# Patient Record
(demographics unavailable — no encounter records)

---

## 2025-04-11 NOTE — ASSESSMENT
[FreeTextEntry1] : Recommend CT of chest to complete staging imaging.  I had extensive discussion with the patient (45 minutes) regarding the diagnosis and treatment options. I recommended that he consider proceeding with a robotic right hemicolectomy possible open procedure.  The associated risks, benefits, alternatives of the procedure have been outlined discussed and reviewed with the patient's family. These risks including but not limited to bleeding, infection, anastomotic leak, need for secondary surgery, need for ileostomy, change in bowel habits, DVT/PE, as well as the risk of heart and lung complications infection and death were detailed. The patient understands these risks and consents the planned procedure. Appropriate  literature regarding surgery and post operative treatment/complications and enhanced recovery pathway has been detailed and reviewed. Consent was obtained. All questions were answered.

## 2025-04-11 NOTE — HISTORY OF PRESENT ILLNESS
[FreeTextEntry1] : 72 y/o M presents for initial evaluation of colon cancer.   Reason for visit: Colon cancer Referred by: Dr. Gordon Cohen  PMHx: arthritis, HTN, prostate cancer (tx with surgery) PSHx: appendectomy 1972, perineal prostatectomy 12/2020  Medications: iron supplement Allergies: NKDA  Denies family hx of CRC/IBD Smoker: denies   3/27/25 Endoscopy with Dr. Gordon Cohen  Impressions: -Esophageal plaques were found, suspicious for candidais. Biopsied.  -Non erosive gastritis, characterized by erythema and granularity. Biopsied.  -Normal duodenal bulb and second portion of the duodenum. Biopsied -Medium sized hiatal hernia.   3/27/25 Colonoscopy with Dr. Gordon Cohen, BP 9  Impressions: -one 8 mm polyp in the transverse colon, removed with a cold snare. Resected and retrieved.  -likely malignant partially obstructing tumor in the ascending colon. Biopsied. Tattooed. -internal non bleeding hemorrhoids  -diverticulosis int he sgimoid colon and in the descending colon.   Pathology: A. Duodenal mucosa within normal limits. No evidence of celiac sprue.  B. Gastric antral mucosa with mild reactive changes. Immunostain for H Pylori is negative.  C. Esophageal squamous mucosa within normal limits. No intraepithealial eosinophils identified.  D. Hyperplastic polyp.  E. 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 MMR and Her2 erica are pending. An addendum will follow.   4/3/25 CT Abdomen and Pelvis  Impression: 4.8 cm heterogenous soft tissue lesion in the ascending colon corresponding to known malignancy.  No evidence of abdominopelvic metastases.  Stable ectatic infrarenal aorta with calcifications.   Patient presents today for initial evaluation of colon cancer.  Patient is currently being followed by GI Dr. Cohen.  Patient is retired and has been alternating his time in  and in [facility]. Was feeling fatigue and tired for two months [facility] where is Hgb was reportedly 5.6. States he did not receive any transfusion there.  [place] on [place]25 and was hospitalized the same night at [facility]. Hgb was 6 and received 2 units of RBC.  Discharged with a Hgb of 8.3 and with advised GI follow up for colonoscopy.  Colonoscopy revealed a partially obstructing tumor in the ascending tumor. Pathology revealed mucinous adenocarcinoma of colon, moderately to poorly differentiated including signet-ring cells. MMR still pending.  Reports last colonoscopy was about more than ten years ago with normal finding.  Last Hgb drawn on 3/24 was 10.1  .  Currently overall patient feels well.  No longer feeling dizzy, fatigue. Denies abdominal pain.  Appetite has been well. Denies N/V. Believes he lost 3-4 pounds in the past three weeks.  Bowel movement is daily. Stool is soft and formed. Denies straining.  Is not aware if he had any bleeding in his stool as he usually does not look at his BM. Reports stool is now dark after starting iron supplement.   Denies any recent blood work.  Only had a CT abdomen and pelvis performed on 4/3/25  Has oncologist appointment with Dr. Mane Zimmerman next Wednesday on 4/16/25

## 2025-05-12 NOTE — HISTORY OF PRESENT ILLNESS
[FreeTextEntry1] : 72 y/o M presents for follow up s/p Robotic Right Hemicolectomy 04/30/2025  PMHx: arthritis, HTN, prostate cancer (tx with surgery) PSHx: appendectomy 1972, perineal prostatectomy 12/2020 Medications: iron supplement Allergies: NKDA Denies family hx of CRC/IBD Smoker: denies  3/27/25 Endoscopy with Dr. Gordon Cohen Impressions: -Esophageal plaques were found, suspicious for candidais. Biopsied. -Non erosive gastritis, characterized by erythema and granularity. Biopsied. -Normal duodenal bulb and second portion of the duodenum. Biopsied -Medium sized hiatal hernia.  3/27/25 Colonoscopy with Dr. Gordon Cohen, BP 9 Impressions: -one 8 mm polyp in the transverse colon, removed with a cold snare. Resected and retrieved. -likely malignant partially obstructing tumor in the ascending colon. Biopsied. Tattooed. -internal non bleeding hemorrhoids -diverticulosis int he sgimoid colon and in the descending colon.  Pathology: A. Duodenal mucosa within normal limits. No evidence of celiac sprue. B. Gastric antral mucosa with mild reactive changes. Immunostain for H Pylori is negative. C. Esophageal squamous mucosa within normal limits. No intraepithealial eosinophils identified. D. Hyperplastic polyp. E. 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 MMR and Her2 erica are pending. An addendum will follow.  4/3/25 CT Abdomen and Pelvis Impression: 4.8 cm heterogenous soft tissue lesion in the ascending colon corresponding to known malignancy. No evidence of abdominopelvic metastases. Stable ectatic infrarenal aorta with calcifications.   Seen 4/11/2025 for initial evaluation of colon cancer. Patient is currently being followed by GI Dr. Cohen. Patient is retired and has been alternating his time in  and in [facility]. Was feeling fatigue and tired for two months [facility] where is Hgb was reportedly 5.6. States he did not receive any transfusion there. [place] the [place] on 3[place]15/25 and was hospitalized the same night at [facility]. Hgb was 6 and received 2 units of RBC. Discharged with a Hgb of 8.3 and with advised GI follow up for colonoscopy. Colonoscopy revealed a partially obstructing tumor in the ascending tumor. Pathology revealed mucinous adenocarcinoma of colon, moderately to poorly differentiated including signet-ring cells. MMR still pending.   CT Chest 4/25/2025: 1. 4 mm solid nodule within the superior segment to the right lower lobe (4:134).  In view of the provided history of an extrathoracic malignancy. Short interval surveillance as per protocol to confirm stability is suggested. 2. 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 Periodic interval surveillance may be of value. 3. Circumferential calcification at the origin of the basilar left subclavian artery and a degree of luminal stenosis cannot be excluded. Further characterization may be of value as clinically indicated to include a nonemergent CTA of the aorta.  Outside Colonoscopy Pathology: A. Duodenum, Second Portion: -   Duodenal mucosa with intact villous architecture -   Negative for increased intraepithelial lymphocytes  B. Antrum: -   Antral mucosa with mild reactive change -   Negative for intestinal metaplasia or dysplasia -   H. pylori microorganisms not identified on H/E slides  C. Esophagus, Proximal: -   Unremarkable superficial squamous epithelium  D. Ascending Colon, Mass: -   Hyperplastic polyp  E. Transverse Colon, Polyp: -   Moderate to poorly differentiated mucinous adenocarcinoma, see note -   Occasional signet rel sings noted  Note: Per the outside pathology report, IHC for mismatch repair proteins reveals loss of nuclear expression for MSH6.  Staining for MSH 1, MSH2, and PMS2 are intact.  HER2/erica is reported as negative (score 0).   4/30/2025 Robotic Right Hemicolectomy Pathology: Specimen(s) Submitted 1  Apical node 2  Right colon  Final Diagnosis 1.  Apical lymph node, excision: -   1 lymph node negative for metastatic adenocarcinoma, 0/1   2.  Right colon, resection: -   Poorly differentiated mucinous adenocarcinoma -   5.7 cm greatest dimension -   Adenocarcinoma involves pericolic adipose tissue -   Margins negative for adenocarcinoma and adenoma -   Lymphovascular invasion not identified -   Perineural invasion not identified -   20 lymph nodes negative for metastatic adenocarcinoma, 0/20 -   2 tubular adenomas -   Diverticulosis -   See synoptic report below -   See note  Note: Mismatch repair protein IHC was performed on the prior consultation biopsy, case [id number].  Per the outside report, the malignant cells show loss of nuclear expression for MSH6.  The remaining proteins MSH1, MSH2, and PMS2 are reported as intact.  Lost off MSH6 only suggests a high probability of Boyce syndrome (sequencing and /or large deletion / duplication testing of germline MSH6 may be indicated).  Synoptic Summary 2: Colon and Rectum - Resection Specimen Procedure:  Right hemicolectomy Tumor Tumor Site:  Cecum Histologic Type:   Mucinous adenocarcinoma Histologic Grade:   G3, poorly differentiated Tumor Size:  5.7 Centimeters (cm) Multiple Primary Sites:   Not applicable Tumor Extent:   Invades through muscularis propria into the pericolonic or perirectal tissue Macroscopic Tumor Perforation:   Not identified Lymphatic and / or Vascular Invasion:    Not identified Perineural Invasion:   Not identified Tumor Budding Score:   Low (0-4) Treatment Effect:   No known presurgical therapy Margins Margin Status for Invasive Carcinoma:    All margins negative for invasive carcinoma  Margin Status for Non-Invasive Tumor:    All margins negative for high-grade dysplasia / intramucosal carcinoma and low-grade dysplasia Regional Lymph Nodes Regional Lymph Node Status:   All regional lymph nodes negative for tumor Number of Lymph Nodes Examined:   21 Tumor Deposits:   Not identified pTNM Classification (AJCC 8th Edition) pT Category:   pT3 pN Category:   pN0 Additional Findings Additional Findings:   Adenoma(s);  Diverticulosis Best Tumor Blocks for Future Studies  Patient presents for post op follow up. Overall doing well. Appetite good, energy good Moving bowels with out issue, denies any rectal bleeding Denies any abdominal pain RLQ inicision site slight pink here and there depending on clothes.

## 2025-05-12 NOTE — ASSESSMENT
[FreeTextEntry1] : Stage II right colon cancer.  MSH6 absent.  Rule out HNPCC syndrome.  Recommend genetics consultation.  Liberalize diet activity.  No indication for postoperative chemotherapy but will require surveillance.  Recommend return to primary GI in 1 year for colonoscopy.  However, patient may require yearly colonoscopy if HNPCC positive.

## 2025-05-12 NOTE — PHYSICAL EXAM
[de-identified] : Abdomen is soft, nontender, nondistended. Incisions are well healed. No hernia or masses

## 2025-05-12 NOTE — HISTORY OF PRESENT ILLNESS
[FreeTextEntry1] : 72 y/o M presents for follow up s/p Robotic Right Hemicolectomy 04/30/2025  PMHx: arthritis, HTN, prostate cancer (tx with surgery) PSHx: appendectomy 1972, perineal prostatectomy 12/2020 Medications: iron supplement Allergies: NKDA Denies family hx of CRC/IBD Smoker: denies  3/27/25 Endoscopy with Dr. Gordon Cohen Impressions: -Esophageal plaques were found, suspicious for candidais. Biopsied. -Non erosive gastritis, characterized by erythema and granularity. Biopsied. -Normal duodenal bulb and second portion of the duodenum. Biopsied -Medium sized hiatal hernia.  3/27/25 Colonoscopy with Dr. Gordon Cohen, BP 9 Impressions: -one 8 mm polyp in the transverse colon, removed with a cold snare. Resected and retrieved. -likely malignant partially obstructing tumor in the ascending colon. Biopsied. Tattooed. -internal non bleeding hemorrhoids -diverticulosis int he sgimoid colon and in the descending colon.  Pathology: A. Duodenal mucosa within normal limits. No evidence of celiac sprue. B. Gastric antral mucosa with mild reactive changes. Immunostain for H Pylori is negative. C. Esophageal squamous mucosa within normal limits. No intraepithealial eosinophils identified. D. Hyperplastic polyp. E. 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 MMR and Her2 erica are pending. An addendum will follow.  4/3/25 CT Abdomen and Pelvis Impression: 4.8 cm heterogenous soft tissue lesion in the ascending colon corresponding to known malignancy. No evidence of abdominopelvic metastases. Stable ectatic infrarenal aorta with calcifications.   Seen 4/11/2025 for initial evaluation of colon cancer. Patient is currently being followed by GI Dr. Cohen. Patient is retired and has been alternating his time in  and in [facility]. Was feeling fatigue and tired for two months [facility] where is Hgb was reportedly 5.6. States he did not receive any transfusion there. [place] the [place] on 3[place]25 and was hospitalized the same night at [facility]. Hgb was 6 and received 2 units of RBC. Discharged with a Hgb of 8.3 and with advised GI follow up for colonoscopy. Colonoscopy revealed a partially obstructing tumor in the ascending tumor. Pathology revealed mucinous adenocarcinoma of colon, moderately to poorly differentiated including signet-ring cells. MMR still pending.   CT Chest 4/25/2025: 1. 4 mm solid nodule within the superior segment to the right lower lobe (4:134).  In view of the provided history of an extrathoracic malignancy. Short interval surveillance as per protocol to confirm stability is suggested. 2. 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 Periodic interval surveillance may be of value. 3. Circumferential calcification at the origin of the basilar left subclavian artery and a degree of luminal stenosis cannot be excluded. Further characterization may be of value as clinically indicated to include a nonemergent CTA of the aorta.  Outside Colonoscopy Pathology: A. Duodenum, Second Portion: -   Duodenal mucosa with intact villous architecture -   Negative for increased intraepithelial lymphocytes  B. Antrum: -   Antral mucosa with mild reactive change -   Negative for intestinal metaplasia or dysplasia -   H. pylori microorganisms not identified on H/E slides  C. Esophagus, Proximal: -   Unremarkable superficial squamous epithelium  D. Ascending Colon, Mass: -   Hyperplastic polyp  E. Transverse Colon, Polyp: -   Moderate to poorly differentiated mucinous adenocarcinoma, see note -   Occasional signet rel sings noted  Note: Per the outside pathology report, IHC for mismatch repair proteins reveals loss of nuclear expression for MSH6.  Staining for MSH 1, MSH2, and PMS2 are intact.  HER2/erica is reported as negative (score 0).   4/30/2025 Robotic Right Hemicolectomy Pathology: Specimen(s) Submitted 1  Apical node 2  Right colon  Final Diagnosis 1.  Apical lymph node, excision: -   1 lymph node negative for metastatic adenocarcinoma, 0/1   2.  Right colon, resection: -   Poorly differentiated mucinous adenocarcinoma -   5.7 cm greatest dimension -   Adenocarcinoma involves pericolic adipose tissue -   Margins negative for adenocarcinoma and adenoma -   Lymphovascular invasion not identified -   Perineural invasion not identified -   20 lymph nodes negative for metastatic adenocarcinoma, 0/20 -   2 tubular adenomas -   Diverticulosis -   See synoptic report below -   See note  Note: Mismatch repair protein IHC was performed on the prior consultation biopsy, case [id number].  Per the outside report, the malignant cells show loss of nuclear expression for MSH6.  The remaining proteins MSH1, MSH2, and PMS2 are reported as intact.  Lost off MSH6 only suggests a high probability of Boyce syndrome (sequencing and /or large deletion / duplication testing of germline MSH6 may be indicated).  Synoptic Summary 2: Colon and Rectum - Resection Specimen Procedure:  Right hemicolectomy Tumor Tumor Site:  Cecum Histologic Type:   Mucinous adenocarcinoma Histologic Grade:   G3, poorly differentiated Tumor Size:  5.7 Centimeters (cm) Multiple Primary Sites:   Not applicable Tumor Extent:   Invades through muscularis propria into the pericolonic or perirectal tissue Macroscopic Tumor Perforation:   Not identified Lymphatic and / or Vascular Invasion:    Not identified Perineural Invasion:   Not identified Tumor Budding Score:   Low (0-4) Treatment Effect:   No known presurgical therapy Margins Margin Status for Invasive Carcinoma:    All margins negative for invasive carcinoma  Margin Status for Non-Invasive Tumor:    All margins negative for high-grade dysplasia / intramucosal carcinoma and low-grade dysplasia Regional Lymph Nodes Regional Lymph Node Status:   All regional lymph nodes negative for tumor Number of Lymph Nodes Examined:   21 Tumor Deposits:   Not identified pTNM Classification (AJCC 8th Edition) pT Category:   pT3 pN Category:   pN0 Additional Findings Additional Findings:   Adenoma(s);  Diverticulosis Best Tumor Blocks for Future Studies  Patient presents for post op follow up. Overall doing well. Appetite good, energy good Moving bowels with out issue, denies any rectal bleeding Denies any abdominal pain RLQ inicision site slight pink here and there depending on clothes.

## 2025-05-12 NOTE — PHYSICAL EXAM
[de-identified] : Abdomen is soft, nontender, nondistended. Incisions are well healed. No hernia or masses